# Patient Record
Sex: FEMALE | Race: WHITE | NOT HISPANIC OR LATINO | URBAN - NONMETROPOLITAN AREA
[De-identification: names, ages, dates, MRNs, and addresses within clinical notes are randomized per-mention and may not be internally consistent; named-entity substitution may affect disease eponyms.]

---

## 2018-08-09 PROBLEM — H02.834: Noted: 2018-08-09

## 2018-08-09 PROBLEM — E11.9: Noted: 2020-02-28

## 2018-08-09 PROBLEM — H02.831: Noted: 2018-08-09

## 2018-08-09 PROBLEM — Z98.890: Noted: 2021-04-09

## 2018-08-09 PROBLEM — Z96.1: Noted: 2020-02-28

## 2018-08-09 PROBLEM — H26.492: Noted: 2021-02-26

## 2018-08-09 PROBLEM — H26.493: Noted: 2020-02-28

## 2020-02-28 ENCOUNTER — IMPORTED ENCOUNTER (OUTPATIENT)
Dept: URBAN - NONMETROPOLITAN AREA CLINIC 1 | Facility: CLINIC | Age: 70
End: 2020-02-28

## 2020-02-28 PROBLEM — Z96.1: Noted: 2020-02-28

## 2020-02-28 PROBLEM — H02.834: Noted: 2018-08-09

## 2020-02-28 PROBLEM — E11.9: Noted: 2020-02-28

## 2020-02-28 PROBLEM — H02.831: Noted: 2018-08-09

## 2020-02-28 PROCEDURE — 99213 OFFICE O/P EST LOW 20 MIN: CPT

## 2020-02-28 NOTE — PATIENT DISCUSSION
PCIOL w/ PCO R>L- ObserveContinue to monitor Pt to notify office with any vision changes or complications with glare DM s DR-Stressed the importance of keeping blood sugars under control blood pressure under control and weight normalization and regular visits with PCP. -Explained the possible effects of poorly controlled diabetes and the damage that diabetes can cause to ocular health. -Patient to check HgbA1C.-Pt instructed to contact our office with any vision changes. - Letter sent to Dr. Javan Bhakta RTC 1 year PCO Check and F/U DM

## 2021-02-12 ENCOUNTER — IMPORTED ENCOUNTER (OUTPATIENT)
Dept: URBAN - NONMETROPOLITAN AREA CLINIC 1 | Facility: CLINIC | Age: 71
End: 2021-02-12

## 2021-02-12 PROCEDURE — 66821 AFTER CATARACT LASER SURGERY: CPT

## 2021-02-12 PROCEDURE — 99214 OFFICE O/P EST MOD 30 MIN: CPT

## 2021-02-12 NOTE — PATIENT DISCUSSION
PCIOL w/ PCO Intraocular lens in place today Discussed YAG PC OU to clear PCO and all RBA's were discussed. Recommend YAG PC OD today  then YAG PC OS 1-2 weeks. Writen consent signed and obtained prior to procedure DM s -Stressed the importance of keeping blood sugars under control blood pressure under control and weight normalization and regular visits with PCP. -Explained the possible effects of poorly controlled diabetes and the damage that diabetes can cause to ocular health. -Patient to check HgbA1C.-Pt instructed to contact our office with any vision changes. - Letter sent to Dr. BR[facility]ERS [facility]

## 2021-02-26 ENCOUNTER — IMPORTED ENCOUNTER (OUTPATIENT)
Dept: URBAN - NONMETROPOLITAN AREA CLINIC 1 | Facility: CLINIC | Age: 71
End: 2021-02-26

## 2021-02-26 PROCEDURE — 66821 AFTER CATARACT LASER SURGERY: CPT

## 2021-02-26 NOTE — PATIENT DISCUSSION
PCO OS -Explained PCO and RBAs of YAG Capsulotomy to pt. -Pt elects to proceed. YAG Caps OS today written consent signed and obtained prior to procedure. s/p YAG PC OD w/ open capsule noted. Stable. *patient wishes to have her POV w/ me @ n/a clinic * DM s DR-Stressed the importance of keeping blood sugars under control blood pressure under control and weight normalization and regular visits with PCP. -Explained the possible effects of poorly controlled diabetes and the damage that diabetes can cause to ocular health. -Patient to check HgbA1C.-Pt instructed to contact our office with any vision changes. - Letter sent to Dr. Leslye Laguna ""

## 2021-04-09 ENCOUNTER — IMPORTED ENCOUNTER (OUTPATIENT)
Dept: URBAN - NONMETROPOLITAN AREA CLINIC 1 | Facility: CLINIC | Age: 71
End: 2021-04-09

## 2021-04-09 PROCEDURE — 99024 POSTOP FOLLOW-UP VISIT: CPT

## 2021-04-09 NOTE — PATIENT DISCUSSION
s/p YAG PC OU s/p YAG PC OU w/ open capsule OU and improved VA stable w/ no issues. continue to monitor DM s DR-Stressed the importance of keeping blood sugars under control blood pressure under control and weight normalization and regular visits with PCP. -Explained the possible effects of poorly controlled diabetes and the damage that diabetes can cause to ocular health. -Patient to check HgbA1C.-Pt instructed to contact our office with any vision changes. - Letter sent to Dr. Lolis Davison

## 2021-07-30 ENCOUNTER — IMPORTED ENCOUNTER (OUTPATIENT)
Dept: URBAN - NONMETROPOLITAN AREA CLINIC 1 | Facility: CLINIC | Age: 71
End: 2021-07-30

## 2021-07-30 PROCEDURE — 99213 OFFICE O/P EST LOW 20 MIN: CPT

## 2021-07-30 NOTE — PATIENT DISCUSSION
s/p YAG PC OU s/p YAG PC OU w/ open capsule OU and improved VA stable w/ no issues. continue to monitor DM s DR-Stressed the importance of keeping blood sugars under control blood pressure under control and weight normalization and regular visits with PCP. -Explained the possible effects of poorly controlled diabetes and the damage that diabetes can cause to ocular health. -Patient to check HgbA1C.-Pt instructed to contact our office with any vision changes. - Letter sent to Dr. Hubert Maldonado

## 2022-04-09 ASSESSMENT — TONOMETRY
OS_IOP_MMHG: 14
OD_IOP_MMHG: 16
OD_IOP_MMHG: 16
OD_IOP_MMHG: 17
OS_IOP_MMHG: 17
OS_IOP_MMHG: 16
OD_IOP_MMHG: 14
OD_IOP_MMHG: 15
OS_IOP_MMHG: 15
OS_IOP_MMHG: 17

## 2022-04-09 ASSESSMENT — VISUAL ACUITY
OD_CC: 20/30-2
OS_GLARE: 20/25-2
OS_CC: 20/30
OS_CC: 20/30
OD_CC: 20/25-2
OD_CC: 20/30-1
OS_CC: 20/20-2
OD_CC: 20/30+2
OD_GLARE: 20/70+
OS_GLARE: 20/40-
OU_CC: 20/20-2
OS_CC: 20/30-2
OD_CC: 20/30
OS_CC: 20/25
OD_GLARE: 20/30
OS_GLARE: 20/50

## 2022-11-10 ENCOUNTER — ESTABLISHED PATIENT (OUTPATIENT)
Dept: RURAL CLINIC 1 | Facility: CLINIC | Age: 72
End: 2022-11-10

## 2022-11-10 DIAGNOSIS — E11.9: ICD-10-CM

## 2022-11-10 PROCEDURE — 99214 OFFICE O/P EST MOD 30 MIN: CPT

## 2022-11-10 ASSESSMENT — VISUAL ACUITY
OU_SC: 20/25
OU_SC: 20/30
OS_SC: 20/25
OD_SC: 20/30

## 2022-11-10 ASSESSMENT — TONOMETRY
OD_IOP_MMHG: 17
OS_IOP_MMHG: 18

## 2022-11-10 NOTE — PATIENT DISCUSSION
s/p YAG PC OU s/p YAG PC OU w/ open capsule OU and improved VA stable w/ no issues. continue to monitor DM s DR-Stressed the importance of keeping blood sugars under control blood pressure under control and weight normalization and regular visits with PCP. -Explained the possible effects of poorly controlled diabetes and the damage that diabetes can cause to ocular health. -Patient to check HgbA1C.-Pt instructed to contact our office with any vision changes. - Letter sent to Dr. Win Nielson.

## 2022-12-28 NOTE — PATIENT DISCUSSION
It was discussed with the patient the importance of good control of their blood sugar, blood pressure, cholesterol, diet, exercise and weight under the guidance of their diabetic doctor to prevent/halt diabetic retinopathy. on the discharge service for the patient. I have reviewed and made amendments to the documentation where necessary.

## 2023-12-13 ENCOUNTER — FOLLOW UP (OUTPATIENT)
Dept: RURAL CLINIC 1 | Facility: CLINIC | Age: 73
End: 2023-12-13

## 2023-12-13 DIAGNOSIS — E11.9: ICD-10-CM

## 2023-12-13 DIAGNOSIS — Z96.1: ICD-10-CM

## 2023-12-13 PROCEDURE — 99214 OFFICE O/P EST MOD 30 MIN: CPT

## 2023-12-13 ASSESSMENT — VISUAL ACUITY
OU_CC: 20/20
OU_SC: 20/20
OD_SC: 20/25
OS_SC: 20/25

## 2023-12-13 ASSESSMENT — TONOMETRY
OD_IOP_MMHG: 17
OS_IOP_MMHG: 17

## 2025-02-11 ENCOUNTER — COMPREHENSIVE EXAM (OUTPATIENT)
Age: 75
End: 2025-02-11

## 2025-02-11 DIAGNOSIS — H02.831: ICD-10-CM

## 2025-02-11 DIAGNOSIS — E11.9: ICD-10-CM

## 2025-02-11 DIAGNOSIS — Z96.1: ICD-10-CM

## 2025-02-11 DIAGNOSIS — H02.834: ICD-10-CM

## 2025-02-11 PROCEDURE — 92014 COMPRE OPH EXAM EST PT 1/>: CPT
